# Patient Record
Sex: MALE | Race: BLACK OR AFRICAN AMERICAN | NOT HISPANIC OR LATINO
[De-identification: names, ages, dates, MRNs, and addresses within clinical notes are randomized per-mention and may not be internally consistent; named-entity substitution may affect disease eponyms.]

---

## 2021-11-04 PROBLEM — Z00.00 ENCOUNTER FOR PREVENTIVE HEALTH EXAMINATION: Status: ACTIVE | Noted: 2021-11-04

## 2021-11-16 ENCOUNTER — NON-APPOINTMENT (OUTPATIENT)
Age: 36
End: 2021-11-16

## 2021-11-16 ENCOUNTER — APPOINTMENT (OUTPATIENT)
Dept: PHYSICAL MEDICINE AND REHAB | Facility: CLINIC | Age: 36
End: 2021-11-16
Payer: COMMERCIAL

## 2021-11-16 VITALS
HEART RATE: 18 BPM | RESPIRATION RATE: 18 BRPM | BODY MASS INDEX: 24.99 KG/M2 | WEIGHT: 150 LBS | HEIGHT: 65 IN | SYSTOLIC BLOOD PRESSURE: 115 MMHG | TEMPERATURE: 97.8 F | DIASTOLIC BLOOD PRESSURE: 67 MMHG

## 2021-11-16 DIAGNOSIS — F17.200 NICOTINE DEPENDENCE, UNSPECIFIED, UNCOMPLICATED: ICD-10-CM

## 2021-11-16 DIAGNOSIS — G89.29 LOW BACK PAIN, UNSPECIFIED: ICD-10-CM

## 2021-11-16 DIAGNOSIS — Z78.9 OTHER SPECIFIED HEALTH STATUS: ICD-10-CM

## 2021-11-16 DIAGNOSIS — M75.52 BURSITIS OF LEFT SHOULDER: ICD-10-CM

## 2021-11-16 DIAGNOSIS — M54.50 LOW BACK PAIN, UNSPECIFIED: ICD-10-CM

## 2021-11-16 PROCEDURE — 99204 OFFICE O/P NEW MOD 45 MIN: CPT

## 2021-11-16 NOTE — HISTORY OF PRESENT ILLNESS
[FreeTextEntry1] : Mr. EDWIN ANG is a very pleasant  R  handed 36 year male who is seen for evaluation of left shoulder  that has been ongoing for 4 months  without any specific injury or inciting event. The pain is located primarily biceps tendon  and left cuff  intermittent in nature and described as dull  . The pain is rated as 3/10 during today's visit, and ranges from 3-4/10. The patient's symptoms are aggravated by ext rotation  and alleviated by rest . The patient denies any night pain,  some left hand numbness/tingling, no weakness, or bowel/bladder dysfunction. The patient has several  other complaints at this time including chronic left knee and lower back pain says they all started up at the same time and can occur together \par he is active plays soccer \par he fell months ago but no residuals \par knee pain is outer left knee but no effusion no swelling no instability \par gait fine \par work is sedentary .\par

## 2021-11-16 NOTE — ASSESSMENT
[FreeTextEntry1] : \par PLAN AND RECOMMENDATIONS :\par \par We discussed differential diagnosis and clinical impression\par explained that as he has many MSK complaints cannot deal with everything at once \par will start RX for left shoulder and address spine and knee with follow up appts \par \par To help the patient understand their condition a plastic 3D model was used to better explain  left shoulder anatomy\par \par \par Recommend\par .symptomatic care and support\par  medications declines Voltaren topical nsaid gel \par  imaging will order MSK US left shoulder- xrays left knee and LS spine dynamic views \par rule out instability in athletic type person \par  referral to PT left shoulder instruct in therex and ergonomics \par  hydrotherapy /heat / cold for pain\par  continue  ergonomic precautions including pacing ,posture and frequent breaks while typing.will explain more in PT \par \par  relative rest and avoidance of painful activity where possible \par  increasing activity as discussed \par  return for follow up after imaging \par \par

## 2021-11-16 NOTE — REVIEW OF SYSTEMS
[Patient Intake Form Reviewed] : Patient intake form was reviewed [Fever] : no fever [Recent Change In Weight] : ~T no recent weight change [Lower Ext Edema] : no lower extremity edema [Joint Pain] : joint pain [Joint Stiffness] : no joint stiffness [Muscle Weakness] : no muscle weakness [Difficulty Walking] : no difficulty walking [Negative] : Heme/Lymph

## 2021-11-16 NOTE — CONSULT LETTER
[FreeTextEntry1] : Dear Dr. BLAYNE CORREA  , \par \par I had the pleasure of evaluating your patient, EDWIN ANG .\par \par Thank you very much for allowing me to participate in the care of this patient. If you have any questions, please do not hesitate to contact me. \par \par Sincerely, \par Sebastián Ang MD \par \par ABPMR Board Certified in Physical Medicine and Rehabilitation\par Certified Fellow of AANEM (Neuromuscular and Electrodiagnostic Medicine)\par Subspecialty certified in Sports Medicine (ABPMR)\par \par  of Physical Medicine and Rehabilitation\par [facility] [employer] [facility]\par [facility]\par \par

## 2021-11-16 NOTE — REASON FOR VISIT
[Initial Evaluation] : an initial evaluation [FreeTextEntry1] : left shoulder but also left knee and LBP -ref by Dr CORREA

## 2021-11-16 NOTE — PHYSICAL EXAM
[FreeTextEntry1] : PHYSICAL EXAM : OBJECTIVE \par \par GENERAL : Awake ,alert and oriented to time place and person \par HEAD : normocephalic and atraumatic \par NECK : supple ,no tracheal deviation ,no thyroid enlargement noted with swallowing\par EYES : sclera and conjunctiva normal no redness,intact extraocular movements \par ENT  : ears and nose normal in appearance -hearing adequate \par PULMONARY: effort normal. No respiratory distress. breathing regular. No wheezes \par LYMPH : No swelling in limbs, capillary return within normal range \par CVS : warm extremities,no palpitations,not short of breath, no visible jugular venous distention\par PSYCH : mood and affect normal ,good eye contact ,normal attention \par ABDOMEN : no visible distension , \par NEUROLOGICAL:cranial nerves intact,muscle tone normal,gait and balance safe except where noted below \par SKIN : warm and dry No rash detected over specific body areas examined \par MUSCULOSKELETAL: normal muscle bulk, no focal bony tenderness /posture normal except where specified below\par left shoulder pain with ext rotation \par neg drop arm test \par tender over biceps insertion and cuff ant \par left knee full ROM \par no pain with ROM \par no swelling no heat \par gait NL \par stance good

## 2021-12-02 ENCOUNTER — RESULT REVIEW (OUTPATIENT)
Age: 36
End: 2021-12-02

## 2021-12-02 ENCOUNTER — APPOINTMENT (OUTPATIENT)
Dept: RADIOLOGY | Facility: CLINIC | Age: 36
End: 2021-12-02
Payer: COMMERCIAL

## 2021-12-02 ENCOUNTER — TRANSCRIPTION ENCOUNTER (OUTPATIENT)
Age: 36
End: 2021-12-02

## 2021-12-02 ENCOUNTER — OUTPATIENT (OUTPATIENT)
Dept: OUTPATIENT SERVICES | Facility: HOSPITAL | Age: 36
LOS: 1 days | End: 2021-12-02

## 2021-12-02 ENCOUNTER — APPOINTMENT (OUTPATIENT)
Dept: ULTRASOUND IMAGING | Facility: CLINIC | Age: 36
End: 2021-12-02
Payer: COMMERCIAL

## 2021-12-02 PROCEDURE — 72110 X-RAY EXAM L-2 SPINE 4/>VWS: CPT | Mod: 26

## 2021-12-02 PROCEDURE — 76881 US COMPL JOINT R-T W/IMG: CPT | Mod: 26,LT

## 2021-12-02 PROCEDURE — 73562 X-RAY EXAM OF KNEE 3: CPT | Mod: 26,LT

## 2021-12-21 ENCOUNTER — APPOINTMENT (OUTPATIENT)
Dept: PHYSICAL MEDICINE AND REHAB | Facility: CLINIC | Age: 36
End: 2021-12-21
Payer: COMMERCIAL

## 2021-12-21 VITALS — BODY MASS INDEX: 24.99 KG/M2 | RESPIRATION RATE: 17 BRPM | WEIGHT: 150 LBS | HEIGHT: 65 IN

## 2021-12-21 DIAGNOSIS — M54.9 DORSALGIA, UNSPECIFIED: ICD-10-CM

## 2021-12-21 DIAGNOSIS — G89.29 PAIN IN LEFT KNEE: ICD-10-CM

## 2021-12-21 DIAGNOSIS — G89.29 PAIN IN LEFT SHOULDER: ICD-10-CM

## 2021-12-21 DIAGNOSIS — M25.562 PAIN IN LEFT KNEE: ICD-10-CM

## 2021-12-21 DIAGNOSIS — M25.512 PAIN IN LEFT SHOULDER: ICD-10-CM

## 2021-12-21 DIAGNOSIS — M77.8 OTHER ENTHESOPATHIES, NOT ELSEWHERE CLASSIFIED: ICD-10-CM

## 2021-12-21 DIAGNOSIS — M67.88 OTHER SPECIFIED DISORDERS OF SYNOVIUM AND TENDON, OTHER SITE: ICD-10-CM

## 2021-12-21 PROCEDURE — 99214 OFFICE O/P EST MOD 30 MIN: CPT | Mod: 95

## 2021-12-21 NOTE — PHYSICAL EXAM
[Normal] : Oriented to person, place, and time, insight and judgement were intact and the affect was normal [de-identified] : adolfo hill

## 2021-12-21 NOTE — HISTORY OF PRESENT ILLNESS
[Home] : at home, [unfilled] , at the time of the visit. [Medical Office: ([facility])___] : at the medical office located in  [Verbal consent obtained from patient] : the patient, [unfilled] [FreeTextEntry1] : Mr. EDWIN ANG is a very pleasant  R  handed 36 year male with several problems - left shoulder   ongoing for 4 months as well as back and knee pains\par seen today to discuss imaging .\par  The shoulder pain is located primarily biceps tendon  and left cuff  intermittent in nature and described as dull  . The pain is rated as 3/10 during today's visit, and ranges from 3-4/10. The patient's symptoms are aggravated by ext rotation  and alleviated by rest . The patient denies any night pain,  some left hand numbness/tingling, no weakness, or bowel/bladder dysfunction.\par  chronic left knee and lower back pain says they all started up at the same time and can occur together \par he is active plays soccer \par he fell months ago but no residuals \par knee pain is outer left knee but no effusion no swelling no instability \par gait fine \par work is sedentary .\par all imaging reassuring \par tendinosis only no tear \par he started rehab here  \par

## 2021-12-21 NOTE — DATA REVIEWED
[Plain X-Rays] : plain X-Rays [Ultrasound] : ultrasound [FreeTextEntry1] : \par  US Joint Complete, Left             Final\par \par No Documents Attached\par \par \par \par Changed By [facility] - Reason: DOCTOR ORDERED \par \par \par \par   EXAM:  US JOINT COMPLETE LT\par \par PROCEDURE DATE:  12/02/2021\par \par \par \par \par INTERPRETATION:  History: Left shoulder pain\par \par Technique: Complete ultrasound imaging of the left shoulder was performed utilizing grayscale and power Doppler techniques, evaluating the left glenohumeral and acromioclavicular joints and associated periarticular soft tissue structures.\par \par Comparison: None available\par \par Findings:\par \par Trace fluid surrounding the extra-articular long head biceps tendon. Extra articular long head biceps tendon appears intact.\par \par Subscapularis tendon appears intact. Mild tendinosis of the infraspinatus and moderate tendinosis of the supraspinatus without definite tear. Mild enthesopathic changes along the greater tuberosity of the supraspinatus insertion.\par \par Acromioclavicular joint has an unremarkable sonographic appearance.\par \par No glenohumeral joint effusion.\par \par Muscle bulk of the supraspinatus and infraspinatus muscles appears normal.\par \par Impression:\par \par Tendinosis of the supraspinatus and infraspinatus tendons without tear.\par \par Trace fluid surrounding the extra-articular long head biceps tendon, which can be seen in the setting of tenosynovitis.\par \par \par \par  Xray Knee 3 Views, Left             Final\par \par No Documents Attached\par \par \par \par \par   EXAM:  XR KNEE AP LAT OBL 3 VIEWS LT\par \par PROCEDURE DATE:  12/02/2021\par \par \par \par \par INTERPRETATION:  INDICATION: Left knee pain\par \par TECHNIQUE: 3 views of the left knee and 2 views of the right knee\par \par COMPARISON: None available\par \par FINDINGS:\par \par There is no fracture or dislocation. There is no significant joint space narrowing.  There is no focal soft tissue abnormality.\par \par \par IMPRESSION:\par \par No significant joint space narrowing.\par \par \par \par \par  Xray Lumbosacral Spine 4 Views w/ Flexion and Extension             Final\par \par No Documents Attached\par \par \par \par \par   EXAM:  XR LS SPINE FLEX EXT MIN 4V\par \par PROCEDURE DATE:  12/02/2021\par \par \par \par \par INTERPRETATION:  Clinical history/reason for exam:Back pain\par \par Comparison: None.\par \par Procedure: 4 views of the lumbar spine including AP, lateral, flexion and extension views\par \par Findings:\par There are 5 nonrib-bearing lumbar vertebral bodies. The pedicles are visualized. There is no evidence of dynamic instability on flexion or extension. The vertebral body heights are maintained. The disc spaces are preserved.\par \par Impression:\par Unremarkable examination.\par \par -\par \par \par \par \par       \par \par \par \par \par \par \par \par \par

## 2021-12-21 NOTE — ASSESSMENT
[FreeTextEntry1] : \par PLAN AND RECOMMENDATIONS :\par \par We discussed imaging diagnosis and clinical impression\par reassured \par cont conservative rx \par Recommend\par .symptomatic care and support\par  medications NSAIDS as needed -  OTC fine (-personal preference )-(once or twice a day), -warned of  possible GI side effects -advised to take with meals or add over the counter Nexium, if sensitive\par \par \par  imaging done \par  referral to pt \par  hydrotherapy /heat / cold for pain\par  continue  ergonomic precautions including pacing ,posture and frequent breaks while typing.\par \par  relative rest and avoidance of painful activity where possible \par  increasing activity as discussed \par  return for follow up 6 weeks \par